# Patient Record
Sex: FEMALE
[De-identification: names, ages, dates, MRNs, and addresses within clinical notes are randomized per-mention and may not be internally consistent; named-entity substitution may affect disease eponyms.]

---

## 2021-09-22 ENCOUNTER — RX ONLY (RX ONLY)
Age: 26
End: 2021-09-22

## 2022-06-21 ENCOUNTER — APPOINTMENT (OUTPATIENT)
Dept: URBAN - METROPOLITAN AREA CLINIC 196 | Age: 27
Setting detail: DERMATOLOGY
End: 2022-06-21

## 2022-06-21 DIAGNOSIS — L70.0 ACNE VULGARIS: ICD-10-CM

## 2022-06-21 PROCEDURE — OTHER COUNSELING: OTHER

## 2022-06-21 PROCEDURE — OTHER ORDER TESTS: OTHER

## 2022-06-21 PROCEDURE — 99214 OFFICE O/P EST MOD 30 MIN: CPT

## 2022-06-21 PROCEDURE — OTHER URINE PREGNANCY TEST: OTHER

## 2022-06-21 PROCEDURE — OTHER ISOTRETINOIN INITIATION: OTHER

## 2022-06-21 PROCEDURE — 81025 URINE PREGNANCY TEST: CPT

## 2022-06-21 ASSESSMENT — LOCATION ZONE DERM
LOCATION ZONE: LIP
LOCATION ZONE: FACE

## 2022-06-21 ASSESSMENT — LOCATION DETAILED DESCRIPTION DERM
LOCATION DETAILED: LEFT INFERIOR FOREHEAD
LOCATION DETAILED: RIGHT INFERIOR MEDIAL FOREHEAD
LOCATION DETAILED: RIGHT CENTRAL MALAR CHEEK
LOCATION DETAILED: RIGHT LOWER CUTANEOUS LIP
LOCATION DETAILED: LEFT INFERIOR CENTRAL MALAR CHEEK

## 2022-06-21 ASSESSMENT — LOCATION SIMPLE DESCRIPTION DERM
LOCATION SIMPLE: LEFT CHEEK
LOCATION SIMPLE: RIGHT LIP
LOCATION SIMPLE: RIGHT CHEEK
LOCATION SIMPLE: LEFT FOREHEAD
LOCATION SIMPLE: RIGHT FOREHEAD

## 2022-06-21 NOTE — PROCEDURE: COUNSELING
Patient Specific Counseling (Will Not Stick From Patient To Patient): Pt D/c Accutane 40 QD in October 19 due to procedure scheduled for Jan. \\nTold by surgeon to hold off on accutane until 3 months post op. Pt had 2 months of Accutane prior to surgery.\\n\\nDiscussed today to restart Accutane. Pt opted to restart Accutane. \\n\\nWeight 105 lbs. \\nIPLEDGE ID - [id number] \\n\\nCONTRACEPTIVE METHOD - OCP and condoms. \\n\\nHX of anxiety, previously on Prozac, but currently controlled. Not following with psych. Discussed to let us know if feelings of changes of mood during accutane course. \\n\\nACNE, RECALCITRANT AND INFLAMMATORY \\n- Risks and benefits of Accutane therapy, as well as alternatives to care, were discussed at length with patient who expressed understanding. I discussed the risks of liver abnormalities as well as possible increase in cholesterol and triglyceride. I explained the absolute need to practice contraception before, during and one month after Accutane therapy. I discussed the more common possible side effects of cheilitis, dry skin, joint pain as well as the possible side effects of decreased night vision, depression and flare of inflammatory bowel disease. I explained they will need monthly labs while on the medication and that the anticipated length of treatment is 5 months. The patient understands there is a possibility of recurrence however, acne is often easier to treat if it recurs after accutane therapy. Questions were answered for the patient. Labs were ordered and Accutane will be initiated pending lab results. Consents signed, scanned into EMR. \\n- I have counseled this patient on the following: Drug should not be shared with anyone. Blood should not be donated while taking isotretinoin. Patient program adherence. Patient Specific Counseling (Will Not Stick From Patient To Patient): Pt D/c Accutane 40 QD in October 19 due to procedure scheduled for Jan. \\nTold by surgeon to hold off on accutane until 3 months post op. Pt had 2 months of Accutane prior to surgery.\\n\\nDiscussed today to restart Accutane. Pt opted to restart Accutane. \\n\\nWeight 105 lbs. \\nIPLEDGE ID - [id number] \\n\\nCONTRACEPTIVE METHOD - OCP and condoms. \\n\\nHX of anxiety, previously on Prozac, but currently controlled. Not following with psych. Discussed to let us know if feelings of changes of mood during accutane course. \\n\\nACNE, RECALCITRANT AND INFLAMMATORY \\n- Risks and benefits of Accutane therapy, as well as alternatives to care, were discussed at length with patient who expressed understanding. I discussed the risks of liver abnormalities as well as possible increase in cholesterol and triglyceride. I explained the absolute need to practice contraception before, during and one month after Accutane therapy. I discussed the more common possible side effects of cheilitis, dry skin, joint pain as well as the possible side effects of decreased night vision, depression and flare of inflammatory bowel disease. I explained they will need monthly labs while on the medication and that the anticipated length of treatment is 5 months. The patient understands there is a possibility of recurrence however, acne is often easier to treat if it recurs after accutane therapy. Questions were answered for the patient. Labs were ordered and Accutane will be initiated pending lab results. Consents signed, scanned into EMR. \\n- I have counseled this patient on the following: Drug should not be shared with anyone. Blood should not be donated while taking isotretinoin. Patient program adherence.

## 2022-07-27 ENCOUNTER — APPOINTMENT (OUTPATIENT)
Dept: URBAN - METROPOLITAN AREA CLINIC 196 | Age: 27
Setting detail: DERMATOLOGY
End: 2022-07-27

## 2022-07-27 DIAGNOSIS — L70.0 ACNE VULGARIS: ICD-10-CM

## 2022-07-27 PROCEDURE — OTHER COUNSELING: OTHER

## 2022-07-27 PROCEDURE — 99214 OFFICE O/P EST MOD 30 MIN: CPT

## 2022-07-27 PROCEDURE — OTHER ORDER TESTS: OTHER

## 2022-07-27 PROCEDURE — OTHER PRESCRIPTION: OTHER

## 2022-07-27 PROCEDURE — OTHER ISOTRETINOIN INITIATION: OTHER

## 2022-07-27 RX ORDER — ISOTRETINOIN 40 MG/1
CAPSULE, LIQUID FILLED ORAL
Qty: 30 | Refills: 0 | Status: ERX | COMMUNITY
Start: 2022-07-27

## 2022-07-27 ASSESSMENT — LOCATION DETAILED DESCRIPTION DERM
LOCATION DETAILED: RIGHT LOWER CUTANEOUS LIP
LOCATION DETAILED: RIGHT INFERIOR MEDIAL FOREHEAD
LOCATION DETAILED: LEFT INFERIOR FOREHEAD
LOCATION DETAILED: LEFT INFERIOR CENTRAL MALAR CHEEK
LOCATION DETAILED: RIGHT CENTRAL MALAR CHEEK

## 2022-07-27 ASSESSMENT — LOCATION SIMPLE DESCRIPTION DERM
LOCATION SIMPLE: LEFT FOREHEAD
LOCATION SIMPLE: RIGHT CHEEK
LOCATION SIMPLE: RIGHT FOREHEAD
LOCATION SIMPLE: LEFT CHEEK
LOCATION SIMPLE: RIGHT LIP

## 2022-07-27 ASSESSMENT — LOCATION ZONE DERM
LOCATION ZONE: LIP
LOCATION ZONE: FACE

## 2022-07-27 NOTE — PROCEDURE: COUNSELING
Patient Specific Counseling (Will Not Stick From Patient To Patient): Pt states she started Prozac a few months ago for anxiety, monitored by PCP. Is aware to call office if any mood changes is noted.\\nPt D/c Accutane 40 QD in October 19 due to procedure scheduled for Jan. \\nTold by surgeon to hold off on accutane until 3 months post op. Pt had 2 months of Accutane prior to surgery. \\n\\nSENT RX isotretinoin 40 mg once daily\\n\\nWeight 105 lbs. \\nIPLEDGE ID - [id number] \\n\\nCONTRACEPTIVE METHOD - OCP and condoms. \\n\\n\\nACNE, RECALCITRANT AND INFLAMMATORY \\n- Risks and benefits of Accutane therapy, as well as alternatives to care, were discussed at length with patient who expressed understanding. I discussed the risks of liver abnormalities as well as possible increase in cholesterol and triglyceride. I explained the absolute need to practice contraception before, during and one month after Accutane therapy. I discussed the more common possible side effects of cheilitis, dry skin, joint pain as well as the possible side effects of decreased night vision, depression and flare of inflammatory bowel disease. I explained they will need monthly labs while on the medication and that the anticipated length of treatment is 5 months. The patient understands there is a possibility of recurrence however, acne is often easier to treat if it recurs after accutane therapy. Questions were answered for the patient. Labs were ordered and Accutane will be initiated pending lab results. Consents signed, scanned into EMR. \\n- I have counseled this patient on the following: Drug should not be shared with anyone. Blood should not be donated while taking isotretinoin. Patient program adherence. Patient Specific Counseling (Will Not Stick From Patient To Patient): Pt states she started Prozac a few months ago for anxiety, monitored by PCP. Is aware to call office if any mood changes is noted.\\nPt D/c Accutane 40 QD in October 19 due to procedure scheduled for Jan. \\nTold by surgeon to hold off on accutane until 3 months post op. Pt had 2 months of Accutane prior to surgery. \\n\\nSENT RX isotretinoin 40 mg once daily\\n\\nWeight 105 lbs. \\nIPLEDGE ID - [id number] \\n\\nCONTRACEPTIVE METHOD - OCP and condoms. \\n\\n\\nACNE, RECALCITRANT AND INFLAMMATORY \\n- Risks and benefits of Accutane therapy, as well as alternatives to care, were discussed at length with patient who expressed understanding. I discussed the risks of liver abnormalities as well as possible increase in cholesterol and triglyceride. I explained the absolute need to practice contraception before, during and one month after Accutane therapy. I discussed the more common possible side effects of cheilitis, dry skin, joint pain as well as the possible side effects of decreased night vision, depression and flare of inflammatory bowel disease. I explained they will need monthly labs while on the medication and that the anticipated length of treatment is 5 months. The patient understands there is a possibility of recurrence however, acne is often easier to treat if it recurs after accutane therapy. Questions were answered for the patient. Labs were ordered and Accutane will be initiated pending lab results. Consents signed, scanned into EMR. \\n- I have counseled this patient on the following: Drug should not be shared with anyone. Blood should not be donated while taking isotretinoin. Patient program adherence.

## 2022-08-30 ENCOUNTER — APPOINTMENT (OUTPATIENT)
Dept: URBAN - METROPOLITAN AREA CLINIC 196 | Age: 27
Setting detail: DERMATOLOGY
End: 2022-08-30

## 2022-08-31 ENCOUNTER — APPOINTMENT (OUTPATIENT)
Dept: URBAN - METROPOLITAN AREA CLINIC 196 | Age: 27
Setting detail: DERMATOLOGY
End: 2022-08-31

## 2022-08-31 DIAGNOSIS — L70.0 ACNE VULGARIS: ICD-10-CM

## 2022-08-31 PROCEDURE — 99214 OFFICE O/P EST MOD 30 MIN: CPT

## 2022-08-31 PROCEDURE — OTHER ISOTRETINOIN MONITORING: OTHER

## 2022-08-31 PROCEDURE — OTHER ORDER TESTS: OTHER

## 2022-08-31 PROCEDURE — OTHER COUNSELING: OTHER

## 2022-08-31 ASSESSMENT — LOCATION DETAILED DESCRIPTION DERM
LOCATION DETAILED: RIGHT INFERIOR MEDIAL FOREHEAD
LOCATION DETAILED: LEFT INFERIOR CENTRAL MALAR CHEEK
LOCATION DETAILED: RIGHT CENTRAL MALAR CHEEK
LOCATION DETAILED: RIGHT LOWER CUTANEOUS LIP
LOCATION DETAILED: LEFT INFERIOR FOREHEAD

## 2022-08-31 ASSESSMENT — LOCATION SIMPLE DESCRIPTION DERM
LOCATION SIMPLE: LEFT CHEEK
LOCATION SIMPLE: RIGHT FOREHEAD
LOCATION SIMPLE: LEFT FOREHEAD
LOCATION SIMPLE: RIGHT LIP
LOCATION SIMPLE: RIGHT CHEEK

## 2022-08-31 ASSESSMENT — LOCATION ZONE DERM
LOCATION ZONE: FACE
LOCATION ZONE: LIP

## 2022-08-31 NOTE — PROCEDURE: ISOTRETINOIN MONITORING
Myalgia Monitoring: I explained this is common when taking isotretinoin. If this worsens they will contact us.
Display Individual Monthly Dosage In The Note (If Yes Will Display All Dosages Which Are Not N/A): no
Include Validation In Note: Yes
Cheilitis Normal Treatment: I recommended application of Vaseline or Aquaphor numerous times a day (as often as every hour) and before going to bed.
Nosebleeds Normal Treatment: I explained this is common when taking isotretinoin. I recommended saline mist in each nostril multiple times a day. If this worsens they will contact us.
Myalgia Treatment: I explained this is common when taking isotretinoin. If this worsens they will contact us. They may try OTC ibuprofen.
Hypertriglyceridemia Monitoring: I explained this is common when taking isotretinoin. We will monitor closely.
Xerosis Normal Treatment: I recommended application of Cetaphil or CeraVe numerous times a day going to bed to all dry areas.
Detail Level: Zone
Cheilitis Aggressive Treatment: I recommended application of Vaseline or Aquaphor numerous times a day (as often as every hour) and before going to bed. I also prescribed a topical steroid for twice daily use.
Retinoid Dermatitis Normal Treatment: I recommended more frequent application of Cetaphil or CeraVe to the areas of dermatitis.
Counseling Text: I reviewed the side effect in detail. Patient should get monthly blood tests, not donate blood, not drive at night if vision affected, and not share medication.
Headache Monitoring: I recommended monitoring the headaches for now. There is no evidence of increased intracranial pressure. They were instructed to call if the headaches are worsening.
Xerosis Aggressive Treatment: I recommended application of Cetaphil or CeraVe numerous times a day going to bed to all dry areas. I also prescribed a topical steroid for twice daily use.
What Is The Patient's Gender: Female
Retinoid Dermatitis Aggressive Treatment: I recommended more frequent application of Cetaphil or CeraVe to the areas of dermatitis. I also prescribed a topical steroid for twice daily use until the dermatitis resolves.
Female Pregnancy Counseling Text: Female patients should also be on two forms of birth control while taking this medication and for one month after their last dose.
Pounds Preamble Statement (Weight Entered In Details Tab): Reported Weight in pounds:
Use Therapeutic Ranged Or Therapeutic Target: please select Range or Target
Are Labs Available For Review?: No- Not Drawn Yet
Kilograms Preamble Statement (Weight Entered In Details Tab): Reported Weight in kilograms:
Lower Range (In Mg/Kg): 120
Female Completion Statement: After discussing her treatment course we decided to discontinue isotretinoin therapy at this time. I explained that she would need to continue her birth control methods for at least one month after the last dosage. She should also get a pregnancy test one month after the last dose. She shouldn't donate blood for one month after the last dose. She should call with any new symptoms of depression.
Male Completion Statement: After discussing his treatment course we decided to discontinue isotretinoin therapy at this time. He shouldn't donate blood for one month after the last dose. He should call with any new symptoms of depression.
Upper Range (In Mg/Kg): 150
Patient Weight (Optional But Required For Cumulative Dose-Numbers And Decimals Only): 105
Xerosis Normal Treatment: I recommended application of Cetaphil or CeraVe numerous times a day and before going to bed to all dry areas.
Weight Units: pounds
Months Of Therapy Completed: 1
Target Cumulative Dosage (In Mg/Kg): 135
Xerosis Aggressive Treatment: I recommended application of Cetaphil or CeraVe numerous times a day and before going to bed to all dry areas. I also prescribed a topical steroid for twice daily use.
Next Month's Dosage: Continue Current Dosage

## 2022-08-31 NOTE — PROCEDURE: COUNSELING
Patient Specific Counseling (Will Not Stick From Patient To Patient): Pt states she started Prozac a few months ago for anxiety, monitored by PCP. Is aware to call office if any mood changes is noted.\\nPt D/c Accutane 40 QD in October 19 due to procedure scheduled for Jan. \\nTold by surgeon to hold off on accutane until 3 months post op. Pt had 2 months of Accutane prior to surgery. \\n\\nCont  RX isotretinoin 40 mg once daily. SOME DRYNESS AND NO OTHER SES.\\n\\nWeight 105 lbs. \\nIPLEDGE ID - [id number] \\nPt completed 1,200 mg so far\\n\\nCONTRACEPTIVE METHOD - OCP and condoms. \\n\\nHaving labs drawn tomorrow. If acceptable will send isotretinoin 40mg daily \\n\\n\\nACNE, RECALCITRANT AND INFLAMMATORY \\n- Risks and benefits of Accutane therapy, as well as alternatives to care, were discussed at length with patient who expressed understanding. I discussed the risks of liver abnormalities as well as possible increase in cholesterol and triglyceride. I explained the absolute need to practice contraception before, during and one month after Accutane therapy. I discussed the more common possible side effects of cheilitis, dry skin, joint pain as well as the possible side effects of decreased night vision, depression and flare of inflammatory bowel disease. I explained they will need monthly labs while on the medication and that the anticipated length of treatment is 5 months. The patient understands there is a possibility of recurrence however, acne is often easier to treat if it recurs after accutane therapy. Questions were answered for the patient. Labs were ordered and Accutane will be initiated pending lab results. Consents signed, scanned into EMR. \\n- I have counseled this patient on the following: Drug should not be shared with anyone. Blood should not be donated while taking isotretinoin. Patient program adherence. Patient Specific Counseling (Will Not Stick From Patient To Patient): Pt states she started Prozac a few months ago for anxiety, monitored by PCP. Is aware to call office if any mood changes is noted.\\nPt D/c Accutane 40 QD in October 19 due to procedure scheduled for Jan. \\nTold by surgeon to hold off on accutane until 3 months post op. Pt had 2 months of Accutane prior to surgery. \\n\\nCont  RX isotretinoin 40 mg once daily. SOME DRYNESS AND NO OTHER SES.\\n\\nWeight 105 lbs. \\nIPLEDGE ID - [id number] \\nPt completed 1,200 mg so far\\n\\nCONTRACEPTIVE METHOD - OCP and condoms. \\n\\nHaving labs drawn tomorrow. If acceptable will send isotretinoin 40mg daily \\n\\n\\nACNE, RECALCITRANT AND INFLAMMATORY \\n- Risks and benefits of Accutane therapy, as well as alternatives to care, were discussed at length with patient who expressed understanding. I discussed the risks of liver abnormalities as well as possible increase in cholesterol and triglyceride. I explained the absolute need to practice contraception before, during and one month after Accutane therapy. I discussed the more common possible side effects of cheilitis, dry skin, joint pain as well as the possible side effects of decreased night vision, depression and flare of inflammatory bowel disease. I explained they will need monthly labs while on the medication and that the anticipated length of treatment is 5 months. The patient understands there is a possibility of recurrence however, acne is often easier to treat if it recurs after accutane therapy. Questions were answered for the patient. Labs were ordered and Accutane will be initiated pending lab results. Consents signed, scanned into EMR. \\n- I have counseled this patient on the following: Drug should not be shared with anyone. Blood should not be donated while taking isotretinoin. Patient program adherence.

## 2022-09-06 ENCOUNTER — RX ONLY (RX ONLY)
Age: 27
End: 2022-09-06

## 2022-09-06 RX ORDER — ISOTRETINOIN 40 MG/1
CAPSULE, LIQUID FILLED ORAL
Qty: 30 | Refills: 0 | Status: ERX

## 2022-10-05 ENCOUNTER — APPOINTMENT (OUTPATIENT)
Dept: URBAN - METROPOLITAN AREA CLINIC 196 | Age: 27
Setting detail: DERMATOLOGY
End: 2022-10-05

## 2022-10-05 DIAGNOSIS — L70.0 ACNE VULGARIS: ICD-10-CM

## 2022-10-05 PROCEDURE — OTHER ORDER TESTS: OTHER

## 2022-10-05 PROCEDURE — OTHER COUNSELING: OTHER

## 2022-10-05 PROCEDURE — OTHER ISOTRETINOIN MONITORING: OTHER

## 2022-10-05 PROCEDURE — 99214 OFFICE O/P EST MOD 30 MIN: CPT

## 2022-10-05 ASSESSMENT — LOCATION DETAILED DESCRIPTION DERM
LOCATION DETAILED: RIGHT CENTRAL MALAR CHEEK
LOCATION DETAILED: LEFT INFERIOR FOREHEAD
LOCATION DETAILED: RIGHT INFERIOR MEDIAL FOREHEAD
LOCATION DETAILED: LEFT INFERIOR CENTRAL MALAR CHEEK
LOCATION DETAILED: RIGHT LOWER CUTANEOUS LIP

## 2022-10-05 ASSESSMENT — LOCATION SIMPLE DESCRIPTION DERM
LOCATION SIMPLE: RIGHT CHEEK
LOCATION SIMPLE: LEFT CHEEK
LOCATION SIMPLE: RIGHT LIP
LOCATION SIMPLE: LEFT FOREHEAD
LOCATION SIMPLE: RIGHT FOREHEAD

## 2022-10-05 ASSESSMENT — LOCATION ZONE DERM
LOCATION ZONE: FACE
LOCATION ZONE: LIP

## 2022-10-05 NOTE — PROCEDURE: COUNSELING
Patient Specific Counseling (Will Not Stick From Patient To Patient): Pt states she started Prozac a few months ago for anxiety, monitored by PCP. Is aware to call office if any mood changes is noted.\\nPt D/c Accutane 40 QD in October 19 due to procedure scheduled for Jan. \\nTold by surgeon to hold off on accutane until 3 months post op. Pt had 2 months of Accutane prior to surgery. \\n\\nCont  RX isotretinoin 40 mg once daily. SOME DRYNESS AND NO OTHER SES. MOISTURIZERS RECOMMENDED. \\n\\nWeight 105 lbs. \\nIPLEDGE ID - [id number] \\nPt completed 2,400mg so far\\n\\nCONTRACEPTIVE METHOD - OCP and condoms. \\n\\nPt. States she just had lab work done earlier today. If acceptable will send isotretinoin 40mg daily \\n\\n\\nACNE, RECALCITRANT AND INFLAMMATORY \\n- Risks and benefits of Accutane therapy, as well as alternatives to care, were discussed at length with patient who expressed understanding. I discussed the risks of liver abnormalities as well as possible increase in cholesterol and triglyceride. I explained the absolute need to practice contraception before, during and one month after Accutane therapy. I discussed the more common possible side effects of cheilitis, dry skin, joint pain as well as the possible side effects of decreased night vision, depression and flare of inflammatory bowel disease. I explained they will need monthly labs while on the medication and that the anticipated length of treatment is 5 months. The patient understands there is a possibility of recurrence however, acne is often easier to treat if it recurs after accutane therapy. Questions were answered for the patient. Labs were ordered and Accutane will be initiated pending lab results. Consents signed, scanned into EMR. \\n- I have counseled this patient on the following: Drug should not be shared with anyone. Blood should not be donated while taking isotretinoin. Patient program adherence. Patient Specific Counseling (Will Not Stick From Patient To Patient): Pt states she started Prozac a few months ago for anxiety, monitored by PCP. Is aware to call office if any mood changes is noted.\\nPt D/c Accutane 40 QD in October 19 due to procedure scheduled for Jan. \\nTold by surgeon to hold off on accutane until 3 months post op. Pt had 2 months of Accutane prior to surgery. \\n\\nCont  RX isotretinoin 40 mg once daily. SOME DRYNESS AND NO OTHER SES. MOISTURIZERS RECOMMENDED. \\n\\nWeight 105 lbs. \\nIPLEDGE ID - [id number] \\nPt completed 2,400mg so far\\n\\nCONTRACEPTIVE METHOD - OCP and condoms. \\n\\nPt. States she just had lab work done earlier today. If acceptable will send isotretinoin 40mg daily \\n\\n\\nACNE, RECALCITRANT AND INFLAMMATORY \\n- Risks and benefits of Accutane therapy, as well as alternatives to care, were discussed at length with patient who expressed understanding. I discussed the risks of liver abnormalities as well as possible increase in cholesterol and triglyceride. I explained the absolute need to practice contraception before, during and one month after Accutane therapy. I discussed the more common possible side effects of cheilitis, dry skin, joint pain as well as the possible side effects of decreased night vision, depression and flare of inflammatory bowel disease. I explained they will need monthly labs while on the medication and that the anticipated length of treatment is 5 months. The patient understands there is a possibility of recurrence however, acne is often easier to treat if it recurs after accutane therapy. Questions were answered for the patient. Labs were ordered and Accutane will be initiated pending lab results. Consents signed, scanned into EMR. \\n- I have counseled this patient on the following: Drug should not be shared with anyone. Blood should not be donated while taking isotretinoin. Patient program adherence.

## 2022-10-05 NOTE — PROCEDURE: ISOTRETINOIN MONITORING
Myalgia Monitoring: I explained this is common when taking isotretinoin. If this worsens they will contact us.
Display Individual Monthly Dosage In The Note (If Yes Will Display All Dosages Which Are Not N/A): no
Include Validation In Note: Yes
Cheilitis Normal Treatment: I recommended application of Vaseline or Aquaphor numerous times a day (as often as every hour) and before going to bed.
Nosebleeds Normal Treatment: I explained this is common when taking isotretinoin. I recommended saline mist in each nostril multiple times a day. If this worsens they will contact us.
Myalgia Treatment: I explained this is common when taking isotretinoin. If this worsens they will contact us. They may try OTC ibuprofen.
Hypertriglyceridemia Monitoring: I explained this is common when taking isotretinoin. We will monitor closely.
Xerosis Normal Treatment: I recommended application of Cetaphil or CeraVe numerous times a day going to bed to all dry areas.
Detail Level: Zone
Cheilitis Aggressive Treatment: I recommended application of Vaseline or Aquaphor numerous times a day (as often as every hour) and before going to bed. I also prescribed a topical steroid for twice daily use.
Retinoid Dermatitis Normal Treatment: I recommended more frequent application of Cetaphil or CeraVe to the areas of dermatitis.
Counseling Text: I reviewed the side effect in detail. Patient should get monthly blood tests, not donate blood, not drive at night if vision affected, and not share medication.
Headache Monitoring: I recommended monitoring the headaches for now. There is no evidence of increased intracranial pressure. They were instructed to call if the headaches are worsening.
Xerosis Aggressive Treatment: I recommended application of Cetaphil or CeraVe numerous times a day going to bed to all dry areas. I also prescribed a topical steroid for twice daily use.
What Is The Patient's Gender: Female
Retinoid Dermatitis Aggressive Treatment: I recommended more frequent application of Cetaphil or CeraVe to the areas of dermatitis. I also prescribed a topical steroid for twice daily use until the dermatitis resolves.
Is Cheilitis Present?: Yes - Normal Treatment
Female Pregnancy Counseling Text: Female patients should also be on two forms of birth control while taking this medication and for one month after their last dose.
Pounds Preamble Statement (Weight Entered In Details Tab): Reported Weight in pounds:
Use Therapeutic Ranged Or Therapeutic Target: please select Range or Target
Are Labs Available For Review?: No- Pending
Kilograms Preamble Statement (Weight Entered In Details Tab): Reported Weight in kilograms:
Lower Range (In Mg/Kg): 120
Female Completion Statement: After discussing her treatment course we decided to discontinue isotretinoin therapy at this time. I explained that she would need to continue her birth control methods for at least one month after the last dosage. She should also get a pregnancy test one month after the last dose. She shouldn't donate blood for one month after the last dose. She should call with any new symptoms of depression.
Male Completion Statement: After discussing his treatment course we decided to discontinue isotretinoin therapy at this time. He shouldn't donate blood for one month after the last dose. He should call with any new symptoms of depression.
Upper Range (In Mg/Kg): 150
Patient Weight (Optional But Required For Cumulative Dose-Numbers And Decimals Only): 105
Xerosis Normal Treatment: I recommended application of Cetaphil or CeraVe numerous times a day and before going to bed to all dry areas.
Weight Units: pounds
Months Of Therapy Completed: 2
Target Cumulative Dosage (In Mg/Kg): 135
Xerosis Aggressive Treatment: I recommended application of Cetaphil or CeraVe numerous times a day and before going to bed to all dry areas. I also prescribed a topical steroid for twice daily use.
Next Month's Dosage: Continue Current Dosage

## 2022-10-11 ENCOUNTER — RX ONLY (RX ONLY)
Age: 27
End: 2022-10-11

## 2022-10-11 RX ORDER — ISOTRETINOIN 40 MG/1
CAPSULE, LIQUID FILLED ORAL
Qty: 30 | Refills: 0 | Status: ERX

## 2022-11-07 ENCOUNTER — APPOINTMENT (OUTPATIENT)
Dept: URBAN - METROPOLITAN AREA CLINIC 196 | Age: 27
Setting detail: DERMATOLOGY
End: 2022-11-07

## 2022-11-07 DIAGNOSIS — L70.0 ACNE VULGARIS: ICD-10-CM

## 2022-11-07 PROCEDURE — OTHER ORDER TESTS: OTHER

## 2022-11-07 PROCEDURE — 99214 OFFICE O/P EST MOD 30 MIN: CPT

## 2022-11-07 PROCEDURE — OTHER ISOTRETINOIN MONITORING: OTHER

## 2022-11-07 PROCEDURE — OTHER COUNSELING: OTHER

## 2022-11-07 ASSESSMENT — LOCATION SIMPLE DESCRIPTION DERM
LOCATION SIMPLE: RIGHT LIP
LOCATION SIMPLE: LEFT FOREHEAD
LOCATION SIMPLE: RIGHT FOREHEAD
LOCATION SIMPLE: RIGHT CHEEK
LOCATION SIMPLE: LEFT CHEEK

## 2022-11-07 ASSESSMENT — LOCATION DETAILED DESCRIPTION DERM
LOCATION DETAILED: LEFT INFERIOR FOREHEAD
LOCATION DETAILED: LEFT INFERIOR CENTRAL MALAR CHEEK
LOCATION DETAILED: RIGHT CENTRAL MALAR CHEEK
LOCATION DETAILED: RIGHT INFERIOR MEDIAL FOREHEAD
LOCATION DETAILED: RIGHT LOWER CUTANEOUS LIP

## 2022-11-07 ASSESSMENT — LOCATION ZONE DERM
LOCATION ZONE: LIP
LOCATION ZONE: FACE

## 2022-11-07 NOTE — PROCEDURE: COUNSELING
Patient Specific Counseling (Will Not Stick From Patient To Patient): Pt states she started Prozac a few months ago for anxiety, monitored by PCP. Is aware to call office if any mood changes is noted.\\nPt D/c Accutane 40 QD in October 19 due to procedure scheduled for Jan. \\nTold by surgeon to hold off on accutane until 3 months post op. Pt had 2 months of Accutane prior to surgery. \\n\\nCont  RX isotretinoin 40 mg once daily. SOME DRYNESS AND NO OTHER SES. MOISTURIZERS RECOMMENDED. \\n\\nWeight 105 lbs. \\nIPLEDGE ID - [id number] \\nPt completed 3,600mg so far\\n\\nCONTRACEPTIVE METHOD - OCP and condoms. \\n\\nPt. States she just had lab work DONE TOMORROW. If acceptable will send isotretinoin 40mg daily \\n\\n\\nACNE, RECALCITRANT AND INFLAMMATORY \\n- Risks and benefits of Accutane therapy, as well as alternatives to care, were discussed at length with patient who expressed understanding. I discussed the risks of liver abnormalities as well as possible increase in cholesterol and triglyceride. I explained the absolute need to practice contraception before, during and one month after Accutane therapy. I discussed the more common possible side effects of cheilitis, dry skin, joint pain as well as the possible side effects of decreased night vision, depression and flare of inflammatory bowel disease. I explained they will need monthly labs while on the medication and that the anticipated length of treatment is 5 months. The patient understands there is a possibility of recurrence however, acne is often easier to treat if it recurs after accutane therapy. Questions were answered for the patient. Labs were ordered and Accutane will be initiated pending lab results. Consents signed, scanned into EMR. \\n- I have counseled this patient on the following: Drug should not be shared with anyone. Blood should not be donated while taking isotretinoin. Patient program adherence. Patient Specific Counseling (Will Not Stick From Patient To Patient): Pt states she started Prozac a few months ago for anxiety, monitored by PCP. Is aware to call office if any mood changes is noted.\\nPt D/c Accutane 40 QD in October 19 due to procedure scheduled for Jan. \\nTold by surgeon to hold off on accutane until 3 months post op. Pt had 2 months of Accutane prior to surgery. \\n\\nCont  RX isotretinoin 40 mg once daily. SOME DRYNESS AND NO OTHER SES. MOISTURIZERS RECOMMENDED. \\n\\nWeight 105 lbs. \\nIPLEDGE ID - [id number] \\nPt completed 3,600mg so far\\n\\nCONTRACEPTIVE METHOD - OCP and condoms. \\n\\nPt. States she just had lab work DONE TOMORROW. If acceptable will send isotretinoin 40mg daily \\n\\n\\nACNE, RECALCITRANT AND INFLAMMATORY \\n- Risks and benefits of Accutane therapy, as well as alternatives to care, were discussed at length with patient who expressed understanding. I discussed the risks of liver abnormalities as well as possible increase in cholesterol and triglyceride. I explained the absolute need to practice contraception before, during and one month after Accutane therapy. I discussed the more common possible side effects of cheilitis, dry skin, joint pain as well as the possible side effects of decreased night vision, depression and flare of inflammatory bowel disease. I explained they will need monthly labs while on the medication and that the anticipated length of treatment is 5 months. The patient understands there is a possibility of recurrence however, acne is often easier to treat if it recurs after accutane therapy. Questions were answered for the patient. Labs were ordered and Accutane will be initiated pending lab results. Consents signed, scanned into EMR. \\n- I have counseled this patient on the following: Drug should not be shared with anyone. Blood should not be donated while taking isotretinoin. Patient program adherence.

## 2022-11-07 NOTE — PROCEDURE: COUNSELING
chest discomfort Dapsone Counseling: I discussed with the patient the risks of dapsone including but not limited to hemolytic anemia, agranulocytosis, rashes, methemoglobinemia, kidney failure, peripheral neuropathy, headaches, GI upset, and liver toxicity.  Patients who start dapsone require monitoring including baseline LFTs and weekly CBCs for the first month, then every month thereafter.  The patient verbalized understanding of the proper use and possible adverse effects of dapsone.  All of the patient's questions and concerns were addressed.

## 2022-11-10 ENCOUNTER — RX ONLY (RX ONLY)
Age: 27
End: 2022-11-10

## 2022-11-10 RX ORDER — ISOTRETINOIN 40 MG/1
CAPSULE, LIQUID FILLED ORAL
Qty: 30 | Refills: 0 | Status: ERX

## 2022-12-13 ENCOUNTER — APPOINTMENT (OUTPATIENT)
Dept: URBAN - METROPOLITAN AREA CLINIC 196 | Age: 27
Setting detail: DERMATOLOGY
End: 2022-12-14

## 2022-12-13 DIAGNOSIS — L70.0 ACNE VULGARIS: ICD-10-CM

## 2022-12-13 PROCEDURE — OTHER URINE PREGNANCY TEST: OTHER

## 2022-12-13 PROCEDURE — 99214 OFFICE O/P EST MOD 30 MIN: CPT

## 2022-12-13 PROCEDURE — OTHER COUNSELING: OTHER

## 2022-12-13 PROCEDURE — OTHER ISOTRETINOIN MONITORING: OTHER

## 2022-12-13 PROCEDURE — OTHER ORDER TESTS: OTHER

## 2022-12-13 PROCEDURE — 81025 URINE PREGNANCY TEST: CPT

## 2022-12-13 ASSESSMENT — LOCATION DETAILED DESCRIPTION DERM
LOCATION DETAILED: LEFT INFERIOR FOREHEAD
LOCATION DETAILED: RIGHT INFERIOR MEDIAL FOREHEAD
LOCATION DETAILED: LEFT INFERIOR CENTRAL MALAR CHEEK
LOCATION DETAILED: RIGHT CENTRAL MALAR CHEEK
LOCATION DETAILED: RIGHT LOWER CUTANEOUS LIP

## 2022-12-13 ASSESSMENT — LOCATION ZONE DERM
LOCATION ZONE: LIP
LOCATION ZONE: FACE

## 2022-12-13 ASSESSMENT — LOCATION SIMPLE DESCRIPTION DERM
LOCATION SIMPLE: RIGHT CHEEK
LOCATION SIMPLE: LEFT FOREHEAD
LOCATION SIMPLE: LEFT CHEEK
LOCATION SIMPLE: RIGHT LIP
LOCATION SIMPLE: RIGHT FOREHEAD

## 2022-12-13 NOTE — PROCEDURE: COUNSELING
Patient Specific Counseling (Will Not Stick From Patient To Patient): Pt states she started Prozac a few months ago for anxiety, monitored by PCP. Is aware to call office if any mood changes is noted.\\nPt D/c Accutane 40 QD in October 19 due to procedure scheduled for Jan. \\nTold by surgeon to hold off on accutane until 3 months post op. Pt had 2 months of Accutane prior to surgery. \\n\\nCont  RX isotretinoin 40 mg once daily. SOME DRYNESS AND NO OTHER SES. MOISTURIZERS RECOMMENDED. \\n\\nWeight 105 lbs. \\nIPLEDGE ID - [id number] \\nPt completed 4800mg so far\\n\\nCONTRACEPTIVE METHOD - OCP and condoms. \\n\\nPt. States she just had lab work DONE TOMORROW. TODAY DID URINE PREGNANCY TEST AND IT WAS NEGATIVE, TODAY I  sent isotretinoin 40mg daily \\n\\n\\nACNE, RECALCITRANT AND INFLAMMATORY \\n- Risks and benefits of Accutane therapy, as well as alternatives to care, were discussed at length with patient who expressed understanding. I discussed the risks of liver abnormalities as well as possible increase in cholesterol and triglyceride. I explained the absolute need to practice contraception before, during and one month after Accutane therapy. I discussed the more common possible side effects of cheilitis, dry skin, joint pain as well as the possible side effects of decreased night vision, depression and flare of inflammatory bowel disease. I explained they will need monthly labs while on the medication and that the anticipated length of treatment is 5 months. The patient understands there is a possibility of recurrence however, acne is often easier to treat if it recurs after accutane therapy. Questions were answered for the patient. Labs were ordered and Accutane will be initiated pending lab results. Consents signed, scanned into EMR. \\n- I have counseled this patient on the following: Drug should not be shared with anyone. Blood should not be donated while taking isotretinoin. Patient program adherence. Patient Specific Counseling (Will Not Stick From Patient To Patient): Pt states she started Prozac a few months ago for anxiety, monitored by PCP. Is aware to call office if any mood changes is noted.\\nPt D/c Accutane 40 QD in October 19 due to procedure scheduled for Jan. \\nTold by surgeon to hold off on accutane until 3 months post op. Pt had 2 months of Accutane prior to surgery. \\n\\nCont  RX isotretinoin 40 mg once daily. SOME DRYNESS AND NO OTHER SES. MOISTURIZERS RECOMMENDED. \\n\\nWeight 105 lbs. \\nIPLEDGE ID - [id number] \\nPt completed 4800mg so far\\n\\nCONTRACEPTIVE METHOD - OCP and condoms. \\n\\nPt. States she just had lab work DONE TOMORROW. TODAY DID URINE PREGNANCY TEST AND IT WAS NEGATIVE, TODAY I  sent isotretinoin 40mg daily \\n\\n\\nACNE, RECALCITRANT AND INFLAMMATORY \\n- Risks and benefits of Accutane therapy, as well as alternatives to care, were discussed at length with patient who expressed understanding. I discussed the risks of liver abnormalities as well as possible increase in cholesterol and triglyceride. I explained the absolute need to practice contraception before, during and one month after Accutane therapy. I discussed the more common possible side effects of cheilitis, dry skin, joint pain as well as the possible side effects of decreased night vision, depression and flare of inflammatory bowel disease. I explained they will need monthly labs while on the medication and that the anticipated length of treatment is 5 months. The patient understands there is a possibility of recurrence however, acne is often easier to treat if it recurs after accutane therapy. Questions were answered for the patient. Labs were ordered and Accutane will be initiated pending lab results. Consents signed, scanned into EMR. \\n- I have counseled this patient on the following: Drug should not be shared with anyone. Blood should not be donated while taking isotretinoin. Patient program adherence.

## 2022-12-13 NOTE — PROCEDURE: ISOTRETINOIN MONITORING
Dosing Month 1 (Required For Cumulative Dosing): 40mg Daily
Myalgia Monitoring: I explained this is common when taking isotretinoin. If this worsens they will contact us.
Display Individual Monthly Dosage In The Note (If Yes Will Display All Dosages Which Are Not N/A): no
Include Validation In Note: Yes
Cheilitis Normal Treatment: I recommended application of Vaseline or Aquaphor numerous times a day (as often as every hour) and before going to bed.
Nosebleeds Normal Treatment: I explained this is common when taking isotretinoin. I recommended saline mist in each nostril multiple times a day. If this worsens they will contact us.
Myalgia Treatment: I explained this is common when taking isotretinoin. If this worsens they will contact us. They may try OTC ibuprofen.
Hypertriglyceridemia Monitoring: I explained this is common when taking isotretinoin. We will monitor closely.
Xerosis Normal Treatment: I recommended application of Cetaphil or CeraVe numerous times a day going to bed to all dry areas.
Detail Level: Zone
Cheilitis Aggressive Treatment: I recommended application of Vaseline or Aquaphor numerous times a day (as often as every hour) and before going to bed. I also prescribed a topical steroid for twice daily use.
Retinoid Dermatitis Normal Treatment: I recommended more frequent application of Cetaphil or CeraVe to the areas of dermatitis.
Counseling Text: I reviewed the side effect in detail. Patient should get monthly blood tests, not donate blood, not drive at night if vision affected, and not share medication.
Headache Monitoring: I recommended monitoring the headaches for now. There is no evidence of increased intracranial pressure. They were instructed to call if the headaches are worsening.
Xerosis Aggressive Treatment: I recommended application of Cetaphil or CeraVe numerous times a day going to bed to all dry areas. I also prescribed a topical steroid for twice daily use.
What Is The Patient's Gender: Female
Retinoid Dermatitis Aggressive Treatment: I recommended more frequent application of Cetaphil or CeraVe to the areas of dermatitis. I also prescribed a topical steroid for twice daily use until the dermatitis resolves.
Is Cheilitis Present?: Yes - Normal Treatment
Female Pregnancy Counseling Text: Female patients should also be on two forms of birth control while taking this medication and for one month after their last dose.
Pounds Preamble Statement (Weight Entered In Details Tab): Reported Weight in pounds:
Use Therapeutic Ranged Or Therapeutic Target: please select Range or Target
Are Labs Available For Review?: No- Pending
Kilograms Preamble Statement (Weight Entered In Details Tab): Reported Weight in kilograms:
Lower Range (In Mg/Kg): 120
Female Completion Statement: After discussing her treatment course we decided to discontinue isotretinoin therapy at this time. I explained that she would need to continue her birth control methods for at least one month after the last dosage. She should also get a pregnancy test one month after the last dose. She shouldn't donate blood for one month after the last dose. She should call with any new symptoms of depression.
Male Completion Statement: After discussing his treatment course we decided to discontinue isotretinoin therapy at this time. He shouldn't donate blood for one month after the last dose. He should call with any new symptoms of depression.
Upper Range (In Mg/Kg): 150
Patient Weight (Optional But Required For Cumulative Dose-Numbers And Decimals Only): 105
Xerosis Normal Treatment: I recommended application of Cetaphil or CeraVe numerous times a day and before going to bed to all dry areas.
Weight Units: pounds
Months Of Therapy Completed: 3
Target Cumulative Dosage (In Mg/Kg): 135
Xerosis Aggressive Treatment: I recommended application of Cetaphil or CeraVe numerous times a day and before going to bed to all dry areas. I also prescribed a topical steroid for twice daily use.

## 2023-01-05 ENCOUNTER — APPOINTMENT (OUTPATIENT)
Dept: URBAN - METROPOLITAN AREA CLINIC 196 | Age: 28
Setting detail: DERMATOLOGY
End: 2023-01-05

## 2023-01-05 ENCOUNTER — RX ONLY (RX ONLY)
Age: 28
End: 2023-01-05

## 2023-01-05 DIAGNOSIS — L70.0 ACNE VULGARIS: ICD-10-CM

## 2023-01-05 PROCEDURE — 81025 URINE PREGNANCY TEST: CPT

## 2023-01-05 PROCEDURE — OTHER URINE PREGNANCY TEST: OTHER

## 2023-01-05 RX ORDER — ISOTRETINOIN 40 MG/1
CAPSULE, LIQUID FILLED ORAL
Qty: 30 | Refills: 0 | Status: ERX

## 2023-01-05 ASSESSMENT — LOCATION SIMPLE DESCRIPTION DERM: LOCATION SIMPLE: LEFT CHEEK

## 2023-01-05 ASSESSMENT — LOCATION DETAILED DESCRIPTION DERM: LOCATION DETAILED: LEFT INFERIOR CENTRAL MALAR CHEEK

## 2023-01-05 ASSESSMENT — LOCATION ZONE DERM: LOCATION ZONE: FACE

## 2023-01-09 ENCOUNTER — RX ONLY (RX ONLY)
Age: 28
End: 2023-01-09

## 2023-01-09 RX ORDER — ISOTRETINOIN 40 MG/1
CAPSULE, LIQUID FILLED ORAL
Qty: 30 | Refills: 0 | Status: ERX

## 2023-02-23 ENCOUNTER — APPOINTMENT (OUTPATIENT)
Dept: URBAN - METROPOLITAN AREA CLINIC 196 | Age: 28
Setting detail: DERMATOLOGY
End: 2023-02-23

## 2023-02-23 ENCOUNTER — RX ONLY (RX ONLY)
Age: 28
End: 2023-02-23

## 2023-02-23 DIAGNOSIS — L70.0 ACNE VULGARIS: ICD-10-CM

## 2023-02-23 PROCEDURE — 81025 URINE PREGNANCY TEST: CPT

## 2023-02-23 PROCEDURE — 99214 OFFICE O/P EST MOD 30 MIN: CPT

## 2023-02-23 PROCEDURE — OTHER ORDER TESTS: OTHER

## 2023-02-23 PROCEDURE — OTHER COUNSELING: OTHER

## 2023-02-23 PROCEDURE — OTHER URINE PREGNANCY TEST: OTHER

## 2023-02-23 PROCEDURE — OTHER ISOTRETINOIN MONITORING: OTHER

## 2023-02-23 RX ORDER — ISOTRETINOIN 40 MG/1
CAPSULE, LIQUID FILLED ORAL
Qty: 30 | Refills: 0 | Status: ERX

## 2023-02-23 ASSESSMENT — LOCATION SIMPLE DESCRIPTION DERM
LOCATION SIMPLE: RIGHT CHEEK
LOCATION SIMPLE: LEFT FOREHEAD
LOCATION SIMPLE: RIGHT FOREHEAD
LOCATION SIMPLE: RIGHT LIP
LOCATION SIMPLE: LEFT CHEEK

## 2023-02-23 ASSESSMENT — LOCATION ZONE DERM
LOCATION ZONE: FACE
LOCATION ZONE: LIP

## 2023-02-23 NOTE — PROCEDURE: ISOTRETINOIN MONITORING
Dosing Month 1 (Required For Cumulative Dosing): 40mg Daily
Myalgia Monitoring: I explained this is common when taking isotretinoin. If this worsens they will contact us.
Display Individual Monthly Dosage In The Note (If Yes Will Display All Dosages Which Are Not N/A): no
Include Validation In Note: Yes
Cheilitis Normal Treatment: I recommended application of Vaseline or Aquaphor numerous times a day (as often as every hour) and before going to bed.
Nosebleeds Normal Treatment: I explained this is common when taking isotretinoin. I recommended saline mist in each nostril multiple times a day. If this worsens they will contact us.
Myalgia Treatment: I explained this is common when taking isotretinoin. If this worsens they will contact us. They may try OTC ibuprofen.
Hypertriglyceridemia Monitoring: I explained this is common when taking isotretinoin. We will monitor closely.
Xerosis Normal Treatment: I recommended application of Cetaphil or CeraVe numerous times a day going to bed to all dry areas.
Detail Level: Zone
Cheilitis Aggressive Treatment: I recommended application of Vaseline or Aquaphor numerous times a day (as often as every hour) and before going to bed. I also prescribed a topical steroid for twice daily use.
Retinoid Dermatitis Normal Treatment: I recommended more frequent application of Cetaphil or CeraVe to the areas of dermatitis.
Counseling Text: I reviewed the side effect in detail. Patient should get monthly blood tests, not donate blood, not drive at night if vision affected, and not share medication.
Headache Monitoring: I recommended monitoring the headaches for now. There is no evidence of increased intracranial pressure. They were instructed to call if the headaches are worsening.
Xerosis Aggressive Treatment: I recommended application of Cetaphil or CeraVe numerous times a day going to bed to all dry areas. I also prescribed a topical steroid for twice daily use.
What Is The Patient's Gender: Female
Retinoid Dermatitis Aggressive Treatment: I recommended more frequent application of Cetaphil or CeraVe to the areas of dermatitis. I also prescribed a topical steroid for twice daily use until the dermatitis resolves.
Is Cheilitis Present?: Yes - Normal Treatment
Female Pregnancy Counseling Text: Female patients should also be on two forms of birth control while taking this medication and for one month after their last dose.
Pounds Preamble Statement (Weight Entered In Details Tab): Reported Weight in pounds:
Use Therapeutic Ranged Or Therapeutic Target: please select Range or Target
Are Labs Available For Review?: No- Pending
Kilograms Preamble Statement (Weight Entered In Details Tab): Reported Weight in kilograms:
Lower Range (In Mg/Kg): 120
Female Completion Statement: After discussing her treatment course we decided to discontinue isotretinoin therapy at this time. I explained that she would need to continue her birth control methods for at least one month after the last dosage. She should also get a pregnancy test one month after the last dose. She shouldn't donate blood for one month after the last dose. She should call with any new symptoms of depression.
Male Completion Statement: After discussing his treatment course we decided to discontinue isotretinoin therapy at this time. He shouldn't donate blood for one month after the last dose. He should call with any new symptoms of depression.
Upper Range (In Mg/Kg): 150
Patient Weight (Optional But Required For Cumulative Dose-Numbers And Decimals Only): 105
Xerosis Normal Treatment: I recommended application of Cetaphil or CeraVe numerous times a day and before going to bed to all dry areas.
Weight Units: pounds
Months Of Therapy Completed: 4
Target Cumulative Dosage (In Mg/Kg): 135
Xerosis Aggressive Treatment: I recommended application of Cetaphil or CeraVe numerous times a day and before going to bed to all dry areas. I also prescribed a topical steroid for twice daily use.

## 2023-02-23 NOTE — PROCEDURE: COUNSELING
Patient Specific Counseling (Will Not Stick From Patient To Patient): Pt states she started Prozac a few months ago for anxiety, monitored by PCP. Is aware to call office if any mood changes is noted.\\nPt D/c Accutane 40 QD in October 19 due to procedure scheduled for Jan. \\nTold by surgeon to hold off on accutane until 3 months post op. Pt had 2 months of Accutane prior to surgery. \\n\\nCont  RX isotretinoin 40 mg once daily. SOME DRYNESS AND NO OTHER SES. MOISTURIZERS RECOMMENDED. \\n\\n\\nLIKELY WILL DO FOR 1-2 MORE MONTHS FOR TOTAL DOSE OF 0459-0344 (150-200 MG/KG CUMULATIVE GOAL).\\n\\nWeight 109 lbs. \\nIPLEDGE ID - [id number] \\nPt completed 6,000mg so far\\n\\nCONTRACEPTIVE METHOD - OCP and condoms. \\n\\nPt is to have bloodwork done tomorrow. \\nUrine pregnancy test done in office today. \\n\\nACNE, RECALCITRANT AND INFLAMMATORY \\n- Risks and benefits of Accutane therapy, as well as alternatives to care, were discussed at length with patient who expressed understanding. I discussed the risks of liver abnormalities as well as possible increase in cholesterol and triglyceride. I explained the absolute need to practice contraception before, during and one month after Accutane therapy. I discussed the more common possible side effects of cheilitis, dry skin, joint pain as well as the possible side effects of decreased night vision, depression and flare of inflammatory bowel disease. I explained they will need monthly labs while on the medication and that the anticipated length of treatment is 5 months. The patient understands there is a possibility of recurrence however, acne is often easier to treat if it recurs after accutane therapy. Questions were answered for the patient. Labs were ordered and Accutane will be initiated pending lab results. Consents signed, scanned into EMR. \\n- I have counseled this patient on the following: Drug should not be shared with anyone. Blood should not be donated while taking isotretinoin. Patient program adherence. Patient Specific Counseling (Will Not Stick From Patient To Patient): Pt states she started Prozac a few months ago for anxiety, monitored by PCP. Is aware to call office if any mood changes is noted.\\nPt D/c Accutane 40 QD in October 19 due to procedure scheduled for Jan. \\nTold by surgeon to hold off on accutane until 3 months post op. Pt had 2 months of Accutane prior to surgery. \\n\\nCont  RX isotretinoin 40 mg once daily. SOME DRYNESS AND NO OTHER SES. MOISTURIZERS RECOMMENDED. \\n\\n\\nLIKELY WILL DO FOR 1-2 MORE MONTHS FOR TOTAL DOSE OF [id number] (150-200 MG/KG CUMULATIVE GOAL).\\n\\nWeight 109 lbs. \\nIPLEDGE ID - [id number] \\nPt completed 6,000mg so far\\n\\nCONTRACEPTIVE METHOD - OCP and condoms. \\n\\nPt is to have bloodwork done tomorrow. \\nUrine pregnancy test done in office today. \\n\\nACNE, RECALCITRANT AND INFLAMMATORY \\n- Risks and benefits of Accutane therapy, as well as alternatives to care, were discussed at length with patient who expressed understanding. I discussed the risks of liver abnormalities as well as possible increase in cholesterol and triglyceride. I explained the absolute need to practice contraception before, during and one month after Accutane therapy. I discussed the more common possible side effects of cheilitis, dry skin, joint pain as well as the possible side effects of decreased night vision, depression and flare of inflammatory bowel disease. I explained they will need monthly labs while on the medication and that the anticipated length of treatment is 5 months. The patient understands there is a possibility of recurrence however, acne is often easier to treat if it recurs after accutane therapy. Questions were answered for the patient. Labs were ordered and Accutane will be initiated pending lab results. Consents signed, scanned into EMR. \\n- I have counseled this patient on the following: Drug should not be shared with anyone. Blood should not be donated while taking isotretinoin. Patient program adherence.

## 2023-03-27 ENCOUNTER — APPOINTMENT (OUTPATIENT)
Dept: URBAN - METROPOLITAN AREA CLINIC 196 | Age: 28
Setting detail: DERMATOLOGY
End: 2023-03-27

## 2023-03-27 DIAGNOSIS — L70.0 ACNE VULGARIS: ICD-10-CM

## 2023-03-27 PROCEDURE — OTHER COUNSELING: OTHER

## 2023-03-27 PROCEDURE — OTHER ISOTRETINOIN MONITORING: OTHER

## 2023-03-27 PROCEDURE — OTHER ORDER TESTS: OTHER

## 2023-03-27 PROCEDURE — 99214 OFFICE O/P EST MOD 30 MIN: CPT

## 2023-03-27 ASSESSMENT — LOCATION DETAILED DESCRIPTION DERM
LOCATION DETAILED: RIGHT INFERIOR MEDIAL FOREHEAD
LOCATION DETAILED: RIGHT LOWER CUTANEOUS LIP
LOCATION DETAILED: LEFT INFERIOR FOREHEAD
LOCATION DETAILED: RIGHT CENTRAL MALAR CHEEK
LOCATION DETAILED: LEFT INFERIOR CENTRAL MALAR CHEEK

## 2023-03-27 ASSESSMENT — LOCATION SIMPLE DESCRIPTION DERM
LOCATION SIMPLE: RIGHT CHEEK
LOCATION SIMPLE: RIGHT LIP
LOCATION SIMPLE: LEFT CHEEK
LOCATION SIMPLE: RIGHT FOREHEAD
LOCATION SIMPLE: LEFT FOREHEAD

## 2023-03-27 ASSESSMENT — LOCATION ZONE DERM
LOCATION ZONE: LIP
LOCATION ZONE: FACE

## 2023-03-27 NOTE — PROCEDURE: COUNSELING
Patient Specific Counseling (Will Not Stick From Patient To Patient): Pt states she started Prozac a few months ago for anxiety, monitored by PCP. Is aware to call office if any mood changes is noted.\\nPt D/c Accutane 40 QD in October 19 due to procedure scheduled for Jan. \\nTold by surgeon to hold off on accutane until 3 months post op. Pt had 2 months of Accutane prior to surgery. \\n\\nCont  RX isotretinoin 40 mg once daily. SOME DRYNESS AND NO OTHER SES. MOISTURIZERS RECOMMENDED. \\n\\n\\nLIKELY WILL DO FOR 1 MORE MONTH SINCE HAD A FEW BREAKOUTS ON LEFT CHEEK: FOR TOTAL DOSE OF 9647-4346 (150-200 MG/KG CUMULATIVE GOAL).\\n\\nWeight 109 lbs. \\nIPLEDGE ID - [id number] \\nPt completed 7,200mg so far\\n\\nCONTRACEPTIVE METHOD - OCP and condoms. \\n\\nPt is to have bloodwork done tomorrow. \\n\\n\\nACNE, RECALCITRANT AND INFLAMMATORY \\n- Risks and benefits of Accutane therapy, as well as alternatives to care, were discussed at length with patient who expressed understanding. I discussed the risks of liver abnormalities as well as possible increase in cholesterol and triglyceride. I explained the absolute need to practice contraception before, during and one month after Accutane therapy. I discussed the more common possible side effects of cheilitis, dry skin, joint pain as well as the possible side effects of decreased night vision, depression and flare of inflammatory bowel disease. I explained they will need monthly labs while on the medication and that the anticipated length of treatment is 5 months. The patient understands there is a possibility of recurrence however, acne is often easier to treat if it recurs after accutane therapy. Questions were answered for the patient. Labs were ordered and Accutane will be initiated pending lab results. Consents signed, scanned into EMR. \\n- I have counseled this patient on the following: Drug should not be shared with anyone. Blood should not be donated while taking isotretinoin. Patient program adherence. Patient Specific Counseling (Will Not Stick From Patient To Patient): Pt states she started Prozac a few months ago for anxiety, monitored by PCP. Is aware to call office if any mood changes is noted.\\nPt D/c Accutane 40 QD in October 19 due to procedure scheduled for Jan. \\nTold by surgeon to hold off on accutane until 3 months post op. Pt had 2 months of Accutane prior to surgery. \\n\\nCont  RX isotretinoin 40 mg once daily. SOME DRYNESS AND NO OTHER SES. MOISTURIZERS RECOMMENDED. \\n\\n\\nLIKELY WILL DO FOR 1 MORE MONTH SINCE HAD A FEW BREAKOUTS ON LEFT CHEEK: FOR TOTAL DOSE OF 2220-4968 (150-200 MG/KG CUMULATIVE GOAL).\\n\\nWeight 109 lbs. \\nIPLEDGE ID - [id number] \\nPt completed 7,200mg so far\\n\\nCONTRACEPTIVE METHOD - OCP and condoms. \\n\\nPt is to have bloodwork done tomorrow. \\n\\n\\nACNE, RECALCITRANT AND INFLAMMATORY \\n- Risks and benefits of Accutane therapy, as well as alternatives to care, were discussed at length with patient who expressed understanding. I discussed the risks of liver abnormalities as well as possible increase in cholesterol and triglyceride. I explained the absolute need to practice contraception before, during and one month after Accutane therapy. I discussed the more common possible side effects of cheilitis, dry skin, joint pain as well as the possible side effects of decreased night vision, depression and flare of inflammatory bowel disease. I explained they will need monthly labs while on the medication and that the anticipated length of treatment is 5 months. The patient understands there is a possibility of recurrence however, acne is often easier to treat if it recurs after accutane therapy. Questions were answered for the patient. Labs were ordered and Accutane will be initiated pending lab results. Consents signed, scanned into EMR. \\n- I have counseled this patient on the following: Drug should not be shared with anyone. Blood should not be donated while taking isotretinoin. Patient program adherence.

## 2023-03-27 NOTE — PROCEDURE: ISOTRETINOIN MONITORING
Dosing Month 1 (Required For Cumulative Dosing): 40mg Daily
Myalgia Monitoring: I explained this is common when taking isotretinoin. If this worsens they will contact us.
Display Individual Monthly Dosage In The Note (If Yes Will Display All Dosages Which Are Not N/A): no
Include Validation In Note: Yes
Cheilitis Normal Treatment: I recommended application of Vaseline or Aquaphor numerous times a day (as often as every hour) and before going to bed.
Nosebleeds Normal Treatment: I explained this is common when taking isotretinoin. I recommended saline mist in each nostril multiple times a day. If this worsens they will contact us.
Myalgia Treatment: I explained this is common when taking isotretinoin. If this worsens they will contact us. They may try OTC ibuprofen.
Hypertriglyceridemia Monitoring: I explained this is common when taking isotretinoin. We will monitor closely.
Xerosis Normal Treatment: I recommended application of Cetaphil or CeraVe numerous times a day going to bed to all dry areas.
Detail Level: Zone
Cheilitis Aggressive Treatment: I recommended application of Vaseline or Aquaphor numerous times a day (as often as every hour) and before going to bed. I also prescribed a topical steroid for twice daily use.
Retinoid Dermatitis Normal Treatment: I recommended more frequent application of Cetaphil or CeraVe to the areas of dermatitis.
Counseling Text: I reviewed the side effect in detail. Patient should get monthly blood tests, not donate blood, not drive at night if vision affected, and not share medication.
Headache Monitoring: I recommended monitoring the headaches for now. There is no evidence of increased intracranial pressure. They were instructed to call if the headaches are worsening.
Xerosis Aggressive Treatment: I recommended application of Cetaphil or CeraVe numerous times a day going to bed to all dry areas. I also prescribed a topical steroid for twice daily use.
What Is The Patient's Gender: Female
Retinoid Dermatitis Aggressive Treatment: I recommended more frequent application of Cetaphil or CeraVe to the areas of dermatitis. I also prescribed a topical steroid for twice daily use until the dermatitis resolves.
Is Cheilitis Present?: Yes - Normal Treatment
Female Pregnancy Counseling Text: Female patients should also be on two forms of birth control while taking this medication and for one month after their last dose.
Pounds Preamble Statement (Weight Entered In Details Tab): Reported Weight in pounds:
Use Therapeutic Ranged Or Therapeutic Target: please select Range or Target
Are Labs Available For Review?: No- Pending
Kilograms Preamble Statement (Weight Entered In Details Tab): Reported Weight in kilograms:
Lower Range (In Mg/Kg): 120
Female Completion Statement: After discussing her treatment course we decided to discontinue isotretinoin therapy at this time. I explained that she would need to continue her birth control methods for at least one month after the last dosage. She should also get a pregnancy test one month after the last dose. She shouldn't donate blood for one month after the last dose. She should call with any new symptoms of depression.
Male Completion Statement: After discussing his treatment course we decided to discontinue isotretinoin therapy at this time. He shouldn't donate blood for one month after the last dose. He should call with any new symptoms of depression.
Upper Range (In Mg/Kg): 150
Patient Weight (Optional But Required For Cumulative Dose-Numbers And Decimals Only): 105
Xerosis Normal Treatment: I recommended application of Cetaphil or CeraVe numerous times a day and before going to bed to all dry areas.
Weight Units: pounds
Months Of Therapy Completed: 5
Target Cumulative Dosage (In Mg/Kg): 135
Xerosis Aggressive Treatment: I recommended application of Cetaphil or CeraVe numerous times a day and before going to bed to all dry areas. I also prescribed a topical steroid for twice daily use.

## 2023-03-29 ENCOUNTER — RX ONLY (RX ONLY)
Age: 28
End: 2023-03-29

## 2023-03-29 RX ORDER — ISOTRETINOIN 40 MG/1
CAPSULE, LIQUID FILLED ORAL
Qty: 30 | Refills: 0 | Status: ERX

## 2023-05-15 ENCOUNTER — APPOINTMENT (OUTPATIENT)
Dept: URBAN - METROPOLITAN AREA CLINIC 196 | Age: 28
Setting detail: DERMATOLOGY
End: 2023-05-15

## 2023-05-15 DIAGNOSIS — L70.0 ACNE VULGARIS: ICD-10-CM

## 2023-05-15 PROCEDURE — OTHER PRESCRIPTION: OTHER

## 2023-05-15 PROCEDURE — OTHER COUNSELING: OTHER

## 2023-05-15 PROCEDURE — OTHER ISOTRETINOIN MONITORING: OTHER

## 2023-05-15 PROCEDURE — OTHER ORDER TESTS: OTHER

## 2023-05-15 PROCEDURE — 99214 OFFICE O/P EST MOD 30 MIN: CPT

## 2023-05-15 RX ORDER — ISOTRETINOIN 40 MG/1
CAPSULE, LIQUID FILLED ORAL
Qty: 30 | Refills: 0 | Status: ERX

## 2023-05-15 ASSESSMENT — LOCATION DETAILED DESCRIPTION DERM
LOCATION DETAILED: RIGHT LOWER CUTANEOUS LIP
LOCATION DETAILED: RIGHT CENTRAL MALAR CHEEK
LOCATION DETAILED: LEFT INFERIOR CENTRAL MALAR CHEEK
LOCATION DETAILED: LEFT INFERIOR FOREHEAD
LOCATION DETAILED: RIGHT INFERIOR MEDIAL FOREHEAD
LOCATION DETAILED: SUBXIPHOID

## 2023-05-15 ASSESSMENT — LOCATION SIMPLE DESCRIPTION DERM
LOCATION SIMPLE: RIGHT FOREHEAD
LOCATION SIMPLE: RIGHT CHEEK
LOCATION SIMPLE: LEFT FOREHEAD
LOCATION SIMPLE: RIGHT LIP
LOCATION SIMPLE: LEFT CHEEK
LOCATION SIMPLE: ABDOMEN

## 2023-05-15 ASSESSMENT — LOCATION ZONE DERM
LOCATION ZONE: LIP
LOCATION ZONE: TRUNK
LOCATION ZONE: FACE

## 2023-05-15 NOTE — PROCEDURE: COUNSELING
Patient Specific Counseling (Will Not Stick From Patient To Patient): Pt states she started Prozac a few months ago for anxiety, monitored by PCP. Is aware to call office if any mood changes is noted.\\nPt D/c Accutane 40 QD in October 19 due to procedure scheduled for Jan. \\nTold by surgeon to hold off on accutane until 3 months post op. Pt had 2 months of Accutane prior to surgery. \\n\\nCont  RX isotretinoin 40 mg once daily. SOME DRYNESS AND NO OTHER SES. MOISTURIZERS RECOMMENDED. \\n\\n\\nLIKELY WILL DO FOR 1 MORE MONTH SINCE HAD A FEW BREAKOUTS ON LEFT CHEEK: FOR TOTAL DOSE OF 1759-5498 (150-200 MG/KG CUMULATIVE GOAL).\\n\\nWeight 109 lbs. \\nIPLEDGE ID - [id number] \\nPt completed 8,400 so far but pt lost recent Accutane pill packet.\\n\\nCONTRACEPTIVE METHOD - OCP and condoms. \\n\\nPt had bloodwork done this morning.  Not back yet therefore did urine pregnancy test in office today.  URINE pregnancy test negative.\\n\\n\\nACNE, RECALCITRANT AND INFLAMMATORY \\n- Risks and benefits of Accutane therapy, as well as alternatives to care, were discussed at length with patient who expressed understanding. I discussed the risks of liver abnormalities as well as possible increase in cholesterol and triglyceride. I explained the absolute need to practice contraception before, during and one month after Accutane therapy. I discussed the more common possible side effects of cheilitis, dry skin, joint pain as well as the possible side effects of decreased night vision, depression and flare of inflammatory bowel disease. I explained they will need monthly labs while on the medication and that the anticipated length of treatment is 5 months. The patient understands there is a possibility of recurrence however, acne is often easier to treat if it recurs after accutane therapy. Questions were answered for the patient. Labs were ordered and Accutane will be initiated pending lab results. Consents signed, scanned into EMR. \\n- I have counseled this patient on the following: Drug should not be shared with anyone. Blood should not be donated while taking isotretinoin. Patient program adherence. Patient Specific Counseling (Will Not Stick From Patient To Patient): Pt states she started Prozac a few months ago for anxiety, monitored by PCP. Is aware to call office if any mood changes is noted.\\nPt D/c Accutane 40 QD in October 19 due to procedure scheduled for Jan. \\nTold by surgeon to hold off on accutane until 3 months post op. Pt had 2 months of Accutane prior to surgery. \\n\\nCont  RX isotretinoin 40 mg once daily. SOME DRYNESS AND NO OTHER SES. MOISTURIZERS RECOMMENDED. \\n\\n\\nLIKELY WILL DO FOR 1 MORE MONTH SINCE HAD A FEW BREAKOUTS ON LEFT CHEEK: FOR TOTAL DOSE OF 4145-0108 (150-200 MG/KG CUMULATIVE GOAL).\\n\\nWeight 109 lbs. \\nIPLEDGE ID - [id number] \\nPt completed 8,400 so far but pt lost recent Accutane pill packet.\\n\\nCONTRACEPTIVE METHOD - OCP and condoms. \\n\\nPt had bloodwork done this morning.  Not back yet therefore did urine pregnancy test in office today.  URINE pregnancy test negative.\\n\\n\\nACNE, RECALCITRANT AND INFLAMMATORY \\n- Risks and benefits of Accutane therapy, as well as alternatives to care, were discussed at length with patient who expressed understanding. I discussed the risks of liver abnormalities as well as possible increase in cholesterol and triglyceride. I explained the absolute need to practice contraception before, during and one month after Accutane therapy. I discussed the more common possible side effects of cheilitis, dry skin, joint pain as well as the possible side effects of decreased night vision, depression and flare of inflammatory bowel disease. I explained they will need monthly labs while on the medication and that the anticipated length of treatment is 5 months. The patient understands there is a possibility of recurrence however, acne is often easier to treat if it recurs after accutane therapy. Questions were answered for the patient. Labs were ordered and Accutane will be initiated pending lab results. Consents signed, scanned into EMR. \\n- I have counseled this patient on the following: Drug should not be shared with anyone. Blood should not be donated while taking isotretinoin. Patient program adherence.

## 2023-06-14 ENCOUNTER — APPOINTMENT (OUTPATIENT)
Dept: URBAN - METROPOLITAN AREA CLINIC 196 | Age: 28
Setting detail: DERMATOLOGY
End: 2023-06-14

## 2023-06-14 DIAGNOSIS — T07XXXA INSECT BITE, NONVENOMOUS, OF OTHER, MULTIPLE, AND UNSPECIFIED SITES, WITHOUT MENTION OF INFECTION: ICD-10-CM

## 2023-06-14 DIAGNOSIS — L70.0 ACNE VULGARIS: ICD-10-CM

## 2023-06-14 PROBLEM — S00.86XA INSECT BITE (NONVENOMOUS) OF OTHER PART OF HEAD, INITIAL ENCOUNTER: Status: ACTIVE | Noted: 2023-06-14

## 2023-06-14 PROCEDURE — OTHER ISOTRETINOIN MONITORING: OTHER

## 2023-06-14 PROCEDURE — OTHER ORDER TESTS: OTHER

## 2023-06-14 PROCEDURE — 81025 URINE PREGNANCY TEST: CPT

## 2023-06-14 PROCEDURE — OTHER COUNSELING: OTHER

## 2023-06-14 PROCEDURE — 99214 OFFICE O/P EST MOD 30 MIN: CPT

## 2023-06-14 PROCEDURE — OTHER URINE PREGNANCY TEST: OTHER

## 2023-06-14 PROCEDURE — OTHER PRESCRIPTION: OTHER

## 2023-06-14 RX ORDER — ISOTRETINOIN 40 MG/1
CAPSULE, LIQUID FILLED ORAL
Qty: 30 | Refills: 0 | Status: CANCELLED

## 2023-06-14 ASSESSMENT — LOCATION DETAILED DESCRIPTION DERM
LOCATION DETAILED: RIGHT LOWER CUTANEOUS LIP
LOCATION DETAILED: LEFT INFERIOR CENTRAL MALAR CHEEK
LOCATION DETAILED: RIGHT CENTRAL MALAR CHEEK
LOCATION DETAILED: LEFT INFERIOR FOREHEAD
LOCATION DETAILED: SUBXIPHOID
LOCATION DETAILED: RIGHT INFERIOR MEDIAL FOREHEAD
LOCATION DETAILED: RIGHT SUPERIOR LATERAL BUCCAL CHEEK

## 2023-06-14 ASSESSMENT — LOCATION SIMPLE DESCRIPTION DERM
LOCATION SIMPLE: LEFT CHEEK
LOCATION SIMPLE: RIGHT LIP
LOCATION SIMPLE: RIGHT FOREHEAD
LOCATION SIMPLE: ABDOMEN
LOCATION SIMPLE: RIGHT CHEEK
LOCATION SIMPLE: LEFT FOREHEAD

## 2023-06-14 ASSESSMENT — LOCATION ZONE DERM
LOCATION ZONE: FACE
LOCATION ZONE: TRUNK
LOCATION ZONE: LIP

## 2023-06-14 NOTE — PROCEDURE: ORDER TESTS
I called pt mother back to help schedule botox. Pt last had botox on 12/29 at another facility per her mother. We set up botox on 3/26 at 12:30p with Dr. Mojica.     Iqra LINARES   Billing Type: Third-Party Bill

## 2023-06-14 NOTE — PROCEDURE: ISOTRETINOIN MONITORING
Dosing Month 1 (Required For Cumulative Dosing): 40mg Daily
Myalgia Monitoring: I explained this is common when taking isotretinoin. If this worsens they will contact us.
Display Individual Monthly Dosage In The Note (If Yes Will Display All Dosages Which Are Not N/A): no
Include Validation In Note: Yes
Cheilitis Normal Treatment: I recommended application of Vaseline or Aquaphor numerous times a day (as often as every hour) and before going to bed.
Nosebleeds Normal Treatment: I explained this is common when taking isotretinoin. I recommended saline mist in each nostril multiple times a day. If this worsens they will contact us.
Myalgia Treatment: I explained this is common when taking isotretinoin. If this worsens they will contact us. They may try OTC ibuprofen.
Hypertriglyceridemia Monitoring: I explained this is common when taking isotretinoin. We will monitor closely.
Xerosis Normal Treatment: I recommended application of Cetaphil or CeraVe numerous times a day going to bed to all dry areas.
Detail Level: Zone
Cheilitis Aggressive Treatment: I recommended application of Vaseline or Aquaphor numerous times a day (as often as every hour) and before going to bed. I also prescribed a topical steroid for twice daily use.
Retinoid Dermatitis Normal Treatment: I recommended more frequent application of Cetaphil or CeraVe to the areas of dermatitis.
Counseling Text: I reviewed the side effect in detail. Patient should get monthly blood tests, not donate blood, not drive at night if vision affected, and not share medication.
Headache Monitoring: I recommended monitoring the headaches for now. There is no evidence of increased intracranial pressure. They were instructed to call if the headaches are worsening.
Xerosis Aggressive Treatment: I recommended application of Cetaphil or CeraVe numerous times a day going to bed to all dry areas. I also prescribed a topical steroid for twice daily use.
What Is The Patient's Gender: Female
Retinoid Dermatitis Aggressive Treatment: I recommended more frequent application of Cetaphil or CeraVe to the areas of dermatitis. I also prescribed a topical steroid for twice daily use until the dermatitis resolves.
Is Cheilitis Present?: Yes - Normal Treatment
Female Pregnancy Counseling Text: Female patients should also be on two forms of birth control while taking this medication and for one month after their last dose.
Pounds Preamble Statement (Weight Entered In Details Tab): Reported Weight in pounds:
Use Therapeutic Ranged Or Therapeutic Target: please select Range or Target
Are Labs Available For Review?: No- Pending
Kilograms Preamble Statement (Weight Entered In Details Tab): Reported Weight in kilograms:
Lower Range (In Mg/Kg): 120
Female Completion Statement: After discussing her treatment course we decided to discontinue isotretinoin therapy at this time. I explained that she would need to continue her birth control methods for at least one month after the last dosage. She should also get a pregnancy test one month after the last dose. She shouldn't donate blood for one month after the last dose. She should call with any new symptoms of depression.
Male Completion Statement: After discussing his treatment course we decided to discontinue isotretinoin therapy at this time. He shouldn't donate blood for one month after the last dose. He should call with any new symptoms of depression.
Upper Range (In Mg/Kg): 150
Patient Weight (Optional But Required For Cumulative Dose-Numbers And Decimals Only): 105
Xerosis Normal Treatment: I recommended application of Cetaphil or CeraVe numerous times a day and before going to bed to all dry areas.
Weight Units: pounds
Months Of Therapy Completed: 6
Target Cumulative Dosage (In Mg/Kg): 135
Xerosis Aggressive Treatment: I recommended application of Cetaphil or CeraVe numerous times a day and before going to bed to all dry areas. I also prescribed a topical steroid for twice daily use.

## 2023-06-14 NOTE — PROCEDURE: COUNSELING
Patient Specific Counseling (Will Not Stick From Patient To Patient): Pt states she started Prozac for anxiety, monitored by PCP. Is aware to call office if any mood changes is noted.\\n\\nSTOP RX isotretinoin 40 mg once daily. SOME DRYNESS AND NO OTHER SES. MOISTURIZERS RECOMMENDED. \\n\\n\\nWeight 109 lbs. \\nIPLEDGE ID - [id number] \\nPt completed 9,600 so far ; REACHED 200 MG/KG WITH NO RECENT BREAKOUTS. \\n\\nCONTRACEPTIVE METHOD - OCP and condoms. \\n\\nPt has not had labs done .urine pregnancy test in office today.  URINE pregnancy test negative.\\n\\n\\nACNE, RECALCITRANT AND INFLAMMATORY \\n- Risks and benefits of Accutane therapy, as well as alternatives to care, were discussed at length with patient who expressed understanding. I discussed the risks of liver abnormalities as well as possible increase in cholesterol and triglyceride. I explained the absolute need to practice contraception before, during and one month after Accutane therapy. I discussed the more common possible side effects of cheilitis, dry skin, joint pain as well as the possible side effects of decreased night vision, depression and flare of inflammatory bowel disease. I explained they will need monthly labs while on the medication and that the anticipated length of treatment is 5 months. The patient understands there is a possibility of recurrence however, acne is often easier to treat if it recurs after accutane therapy. Questions were answered for the patient. Labs were ordered and Accutane will be initiated pending lab results. Consents signed, scanned into EMR. \\n- I have counseled this patient on the following: Drug should not be shared with anyone. Blood should not be donated while taking isotretinoin. Patient program adherence. Patient Specific Counseling (Will Not Stick From Patient To Patient): Pt states she started Prozac for anxiety, monitored by PCP. Is aware to call office if any mood changes is noted.\\n\\nSTOP RX isotretinoin 40 mg once daily. SOME DRYNESS AND NO OTHER SES. MOISTURIZERS RECOMMENDED. \\n\\n\\nWeight 109 lbs. \\nIPLEDGE ID - [id number] \\nPt completed 9,600 so far ; REACHED 200 MG/KG WITH NO RECENT BREAKOUTS. \\n\\nCONTRACEPTIVE METHOD - OCP and condoms. \\n\\nPt has not had labs done .urine pregnancy test in office today.  URINE pregnancy test negative.\\n\\n\\nACNE, RECALCITRANT AND INFLAMMATORY \\n- Risks and benefits of Accutane therapy, as well as alternatives to care, were discussed at length with patient who expressed understanding. I discussed the risks of liver abnormalities as well as possible increase in cholesterol and triglyceride. I explained the absolute need to practice contraception before, during and one month after Accutane therapy. I discussed the more common possible side effects of cheilitis, dry skin, joint pain as well as the possible side effects of decreased night vision, depression and flare of inflammatory bowel disease. I explained they will need monthly labs while on the medication and that the anticipated length of treatment is 5 months. The patient understands there is a possibility of recurrence however, acne is often easier to treat if it recurs after accutane therapy. Questions were answered for the patient. Labs were ordered and Accutane will be initiated pending lab results. Consents signed, scanned into EMR. \\n- I have counseled this patient on the following: Drug should not be shared with anyone. Blood should not be donated while taking isotretinoin. Patient program adherence.

## 2023-06-27 ENCOUNTER — APPOINTMENT (OUTPATIENT)
Dept: URBAN - METROPOLITAN AREA CLINIC 196 | Age: 28
Setting detail: DERMATOLOGY
End: 2023-06-27

## 2023-06-27 DIAGNOSIS — L70.0 ACNE VULGARIS: ICD-10-CM

## 2023-06-27 PROCEDURE — OTHER ORDER TESTS: OTHER

## 2023-06-27 ASSESSMENT — LOCATION DETAILED DESCRIPTION DERM: LOCATION DETAILED: LEFT CENTRAL MALAR CHEEK

## 2023-06-27 ASSESSMENT — LOCATION ZONE DERM: LOCATION ZONE: FACE

## 2023-06-27 ASSESSMENT — LOCATION SIMPLE DESCRIPTION DERM: LOCATION SIMPLE: LEFT CHEEK
